# Patient Record
Sex: FEMALE | Race: WHITE | NOT HISPANIC OR LATINO | Employment: OTHER | URBAN - METROPOLITAN AREA
[De-identification: names, ages, dates, MRNs, and addresses within clinical notes are randomized per-mention and may not be internally consistent; named-entity substitution may affect disease eponyms.]

---

## 2017-01-24 ENCOUNTER — OFFICE VISIT (OUTPATIENT)
Dept: OBSTETRICS AND GYNECOLOGY | Facility: CLINIC | Age: 62
End: 2017-01-24

## 2017-01-24 VITALS
SYSTOLIC BLOOD PRESSURE: 130 MMHG | BODY MASS INDEX: 43.63 KG/M2 | DIASTOLIC BLOOD PRESSURE: 70 MMHG | HEIGHT: 67 IN | WEIGHT: 278 LBS

## 2017-01-24 DIAGNOSIS — Z01.419 WELL WOMAN EXAM WITH ROUTINE GYNECOLOGICAL EXAM: Primary | ICD-10-CM

## 2017-01-24 DIAGNOSIS — Z79.890 HORMONE REPLACEMENT THERAPY: ICD-10-CM

## 2017-01-24 PROCEDURE — 99396 PREV VISIT EST AGE 40-64: CPT | Performed by: OBSTETRICS & GYNECOLOGY

## 2017-01-24 RX ORDER — ALBUTEROL SULFATE 90 UG/1
2 AEROSOL, METERED RESPIRATORY (INHALATION) EVERY 6 HOURS
COMMUNITY

## 2017-01-24 RX ORDER — MULTIVITAMIN
1 CAPSULE ORAL
COMMUNITY

## 2017-01-24 RX ORDER — MONTELUKAST SODIUM 10 MG/1
10 TABLET ORAL
COMMUNITY

## 2017-01-24 RX ORDER — AZELASTINE HCL 205.5 UG/1
SPRAY NASAL
COMMUNITY
Start: 2014-08-29

## 2017-01-24 RX ORDER — PANTOPRAZOLE SODIUM 40 MG/1
TABLET, DELAYED RELEASE ORAL
COMMUNITY
Start: 2016-08-23

## 2017-01-24 RX ORDER — FEXOFENADINE HYDROCHLORIDE 60 MG/1
60 TABLET, FILM COATED ORAL
COMMUNITY

## 2017-01-24 RX ORDER — LOSARTAN POTASSIUM AND HYDROCHLOROTHIAZIDE 12.5; 5 MG/1; MG/1
1 TABLET ORAL
COMMUNITY
Start: 2016-08-23

## 2017-01-24 RX ORDER — SERTRALINE HYDROCHLORIDE 100 MG/1
TABLET, FILM COATED ORAL
COMMUNITY
Start: 2015-05-24

## 2017-01-24 NOTE — MR AVS SNAPSHOT
Nay Oneill   1/24/2017 10:20 AM   Office Visit    Dept Phone:  [phone]   Encounter #:  [id number]    Provider:  Beto Haile MD   Department:  [facility]                Your Full Care Plan              Today's Medication Changes          These changes are accurate as of: 1/24/17 11:13 AM.  If you have any questions, ask your nurse or doctor.               Medication(s)that have changed:     estrogens (conjugated) 0.625 MG tablet   Commonly known as:  PREMARIN   Take 1 tablet by mouth Daily.   What changed:  when to take this   Changed by:  Beto Haile MD            Where to Get Your Medications      These medications were sent to [facility] [address] [place] [address] [address] [place] [address] AT [facility] - [phone]  - [phone]   [facility] [address]     Phone:  [phone]     estrogens (conjugated) 0.625 MG tablet                  Your Updated Medication List          This list is accurate as of: 1/24/17 11:13 AM.  Always use your most recent med list.                albuterol 108 (90 BASE) MCG/ACT inhaler   Commonly known as:  PROVENTIL HFA;VENTOLIN HFA       azelastine 0.15 % solution nasal spray   Commonly known as:  ASTEPRO       CALCIUM 600+D 600-400 MG-UNIT per tablet   Generic drug:  calcium carbonate-vitamin d       estrogens (conjugated) 0.625 MG tablet   Commonly known as:  PREMARIN   Take 1 tablet by mouth Daily.       fexofenadine 60 MG tablet   Commonly known as:  ALLEGRA       losartan-hydrochlorothiazide 50-12.5 MG per tablet   Commonly known as:  HYZAAR       montelukast 10 MG tablet   Commonly known as:  SINGULAIR       multivitamin capsule       pantoprazole 40 MG EC tablet   Commonly known as:  PROTONIX       sertraline 100 MG tablet   Commonly known as:  ZOLOFT               You Were Diagnosed With        Codes Comments    Well woman exam with routine gynecological exam  "   -  Primary ICD-10-CM: Z01.419  ICD-9-CM: V72.31     Hormone replacement therapy     ICD-10-CM: Z79.890  ICD-9-CM: V07.4       Instructions     None    Patient Instructions History      Upcoming Appointments     Visit Type Date Time Department    MAMMO BENNINGER 1/24/2017 10:00 AM [facility]    WELLNESS 1/24/2017 10:20 AM [facility]      [employer] Signup     Our records indicate that you have an active [employer] account.    You can view your After Visit Summary by going to [employer] and logging in with your [employer] username and password.  If you don't have a [employer] username and password but a parent or guardian has access to your record, the parent or guardian should login with their own [employer] username and password and access your record to view the After Visit Summary.    If you have questions, you can email [email or web address] or call [phone] to talk to our [employer] staff.  Remember, [employer] is NOT to be used for urgent needs.  For medical emergencies, dial 911.               Other Info from Your Visit           Allergies     Ciprofloxacin  Hives    Ciprofloxacin-ciproflox Hcl Er  Hives    Duloxetine Hcl      Elevated blood pressure    Fluocinolone Acetonide  Hives, Rash    Lisinopril-hydrochlorothiazide  Rash      Reason for Visit     Gynecologic Exam MX TODAY  DX 2016  C-SCOPY 3 YRS. AGO      Vital Signs     Blood Pressure Height Weight Body Mass Index Smoking Status       130/70 67\" (170.2 cm) 278 lb (126 kg) 43.54 kg/m2 Never Smoker       Problems and Diagnoses Noted     Well woman exam with routine gynecological exam    -  Primary    Hormone replacement therapy            "

## 2017-01-24 NOTE — PROGRESS NOTES
Subjective   Nay Oneill is a 61 y.o. female is here today as a self referral for annual.    History of Present Illness-annual exam and checkup with refill estrogen.    The following portions of the patient's history were reviewed and updated as appropriate: allergies, current medications, past family history, past medical history, past social history, past surgical history and problem list.    Review of Systems   Constitutional: Negative.    HENT: Negative.    Eyes: Negative.    Respiratory: Negative.    Cardiovascular: Negative.    Gastrointestinal: Negative.    Endocrine: Negative.    Genitourinary: Negative.    Musculoskeletal: Negative.    Skin: Negative.    Allergic/Immunologic: Negative.    Neurological: Negative.    Hematological: Negative.    Psychiatric/Behavioral: Negative.        Objective   Physical Exam   Constitutional: She is oriented to person, place, and time. She appears well-developed and well-nourished.   HENT:   Head: Normocephalic and atraumatic.   Nose: Nose normal.   Eyes: Conjunctivae and EOM are normal. Pupils are equal, round, and reactive to light.   Neck: Normal range of motion. Neck supple.   Cardiovascular: Normal rate, regular rhythm and normal heart sounds.    Pulmonary/Chest: Effort normal and breath sounds normal. Right breast exhibits no inverted nipple, no mass, no nipple discharge, no skin change and no tenderness. Left breast exhibits no inverted nipple, no mass, no nipple discharge and no skin change. Breasts are symmetrical. There is no breast swelling.   Abdominal: Soft. Hernia confirmed negative in the right inguinal area and confirmed negative in the left inguinal area.   Genitourinary: Rectum normal. No breast tenderness, discharge or bleeding. Pelvic exam was performed with patient supine. No labial fusion. There is no rash, tenderness, lesion or injury on the right labia. There is no rash, tenderness, lesion or injury on the left labia. Right adnexum displays no  mass, no tenderness and no fullness. Left adnexum displays no mass, no tenderness and no fullness. No erythema or bleeding in the vagina. No foreign body in the vagina. No signs of injury around the vagina. No vaginal discharge found.   Genitourinary Comments: Patient with a third degree vaginal bulge which appears to be the anterior vaginal wall.   Neurological: She is alert and oriented to person, place, and time. She has normal reflexes.   Skin: Skin is warm and dry.   Psychiatric: She has a normal mood and affect. Her behavior is normal. Judgment and thought content normal.         Assessment/Plan   Problems Addressed this Visit     None      Visit Diagnoses     Well woman exam with routine gynecological exam    -  Primary    Hormone replacement therapy            Mammogram done today and current with colonoscopy.  Premarin refilled but tapering or discontinuation was discussed.  Patient given the name of Dr. Shelia Woodruff if she would like to have a vaginal repair considered.

## 2018-10-24 DIAGNOSIS — Z12.31 VISIT FOR SCREENING MAMMOGRAM: Primary | ICD-10-CM

## 2018-10-30 ENCOUNTER — APPOINTMENT (OUTPATIENT)
Dept: WOMENS IMAGING | Facility: HOSPITAL | Age: 63
End: 2018-10-30

## 2018-10-30 PROCEDURE — 77067 SCR MAMMO BI INCL CAD: CPT | Performed by: RADIOLOGY

## 2018-11-06 DIAGNOSIS — Z12.31 VISIT FOR SCREENING MAMMOGRAM: ICD-10-CM

## 2021-03-19 ENCOUNTER — BULK ORDERING (OUTPATIENT)
Dept: CASE MANAGEMENT | Facility: OTHER | Age: 66
End: 2021-03-19

## 2021-03-19 DIAGNOSIS — Z23 IMMUNIZATION DUE: ICD-10-CM
